# Patient Record
Sex: FEMALE | Race: WHITE | NOT HISPANIC OR LATINO | Employment: OTHER | URBAN - METROPOLITAN AREA
[De-identification: names, ages, dates, MRNs, and addresses within clinical notes are randomized per-mention and may not be internally consistent; named-entity substitution may affect disease eponyms.]

---

## 2017-01-16 RX ORDER — SIMVASTATIN 20 MG
TABLET ORAL
Qty: 30 TABLET | Refills: 2 | Status: SHIPPED | OUTPATIENT
Start: 2017-01-16 | End: 2017-10-09 | Stop reason: SDUPTHER

## 2017-01-16 RX ORDER — PEN NEEDLE, DIABETIC 31 GX5/16"
NEEDLE, DISPOSABLE MISCELLANEOUS
Qty: 100 EACH | Refills: 3 | Status: SHIPPED | OUTPATIENT
Start: 2017-01-16

## 2017-03-14 RX ORDER — PAROXETINE 30 MG/1
TABLET, FILM COATED ORAL
Qty: 60 TABLET | Refills: 2 | Status: SHIPPED | OUTPATIENT
Start: 2017-03-14 | End: 2017-10-09 | Stop reason: SDUPTHER

## 2017-04-19 RX ORDER — LISINOPRIL 10 MG/1
TABLET ORAL
Qty: 30 TABLET | Refills: 0 | OUTPATIENT
Start: 2017-04-19

## 2017-06-13 RX ORDER — LISINOPRIL 10 MG/1
TABLET ORAL
Qty: 30 TABLET | Refills: 0 | OUTPATIENT
Start: 2017-06-13

## 2017-07-03 RX ORDER — LISINOPRIL 10 MG/1
TABLET ORAL
Qty: 30 TABLET | Refills: 0 | OUTPATIENT
Start: 2017-07-03

## 2017-08-04 RX ORDER — SIMVASTATIN 20 MG
TABLET ORAL
Qty: 30 TABLET | Refills: 2 | OUTPATIENT
Start: 2017-08-04

## 2017-10-09 ENCOUNTER — FLU SHOT (OUTPATIENT)
Dept: INTERNAL MEDICINE | Facility: CLINIC | Age: 57
End: 2017-10-09

## 2017-10-09 DIAGNOSIS — I10 ESSENTIAL HYPERTENSION: ICD-10-CM

## 2017-10-09 DIAGNOSIS — IMO0002 UNCONTROLLED TYPE 2 DIABETES MELLITUS WITH COMPLICATION, WITH LONG-TERM CURRENT USE OF INSULIN: ICD-10-CM

## 2017-10-09 PROCEDURE — 90686 IIV4 VACC NO PRSV 0.5 ML IM: CPT | Performed by: INTERNAL MEDICINE

## 2017-10-09 PROCEDURE — 90471 IMMUNIZATION ADMIN: CPT | Performed by: INTERNAL MEDICINE

## 2017-10-09 RX ORDER — LISINOPRIL 20 MG/1
20 TABLET ORAL DAILY
Qty: 30 TABLET | Refills: 0 | Status: SHIPPED | OUTPATIENT
Start: 2017-10-09 | End: 2018-01-11 | Stop reason: SDUPTHER

## 2017-10-09 RX ORDER — BLOOD-GLUCOSE METER
EACH MISCELLANEOUS
Qty: 1 KIT | Refills: 0 | Status: SHIPPED | OUTPATIENT
Start: 2017-10-09

## 2017-10-09 RX ORDER — PAROXETINE 30 MG/1
30 TABLET, FILM COATED ORAL 2 TIMES DAILY
Qty: 60 TABLET | Refills: 0 | Status: SHIPPED | OUTPATIENT
Start: 2017-10-09 | End: 2018-01-11 | Stop reason: SDUPTHER

## 2017-10-09 RX ORDER — SIMVASTATIN 20 MG
20 TABLET ORAL NIGHTLY
Qty: 30 TABLET | Refills: 0 | Status: SHIPPED | OUTPATIENT
Start: 2017-10-09 | End: 2018-01-11 | Stop reason: SDUPTHER

## 2017-10-09 RX ORDER — INSULIN GLARGINE 300 U/ML
25 INJECTION, SOLUTION SUBCUTANEOUS NIGHTLY
Qty: 3 PEN | Refills: 0 | Status: SHIPPED | OUTPATIENT
Start: 2017-10-09 | End: 2018-01-11 | Stop reason: SDUPTHER

## 2017-10-28 DIAGNOSIS — I10 ESSENTIAL HYPERTENSION: ICD-10-CM

## 2017-10-28 RX ORDER — LISINOPRIL 20 MG/1
TABLET ORAL
Qty: 90 TABLET | Refills: 1 | OUTPATIENT
Start: 2017-10-28

## 2017-12-17 DIAGNOSIS — IMO0002 UNCONTROLLED TYPE 2 DIABETES MELLITUS WITH COMPLICATION, WITH LONG-TERM CURRENT USE OF INSULIN: ICD-10-CM

## 2017-12-18 RX ORDER — INSULIN ASPART 100 [IU]/ML
INJECTION, SOLUTION INTRAVENOUS; SUBCUTANEOUS
Refills: 4 | OUTPATIENT
Start: 2017-12-18

## 2018-01-11 ENCOUNTER — OFFICE VISIT (OUTPATIENT)
Dept: INTERNAL MEDICINE | Facility: CLINIC | Age: 58
End: 2018-01-11

## 2018-01-11 VITALS
OXYGEN SATURATION: 99 % | BODY MASS INDEX: 48.03 KG/M2 | WEIGHT: 262.6 LBS | DIASTOLIC BLOOD PRESSURE: 92 MMHG | SYSTOLIC BLOOD PRESSURE: 138 MMHG | HEART RATE: 108 BPM

## 2018-01-11 DIAGNOSIS — E11.00 UNCONTROLLED TYPE 2 DIABETES MELLITUS WITH HYPEROSMOLARITY WITHOUT COMA, WITHOUT LONG-TERM CURRENT USE OF INSULIN (HCC): Primary | ICD-10-CM

## 2018-01-11 DIAGNOSIS — E78.2 MIXED HYPERLIPIDEMIA: ICD-10-CM

## 2018-01-11 DIAGNOSIS — F41.9 ANXIETY: ICD-10-CM

## 2018-01-11 DIAGNOSIS — I10 ESSENTIAL HYPERTENSION: ICD-10-CM

## 2018-01-11 PROCEDURE — 99215 OFFICE O/P EST HI 40 MIN: CPT | Performed by: INTERNAL MEDICINE

## 2018-01-11 PROCEDURE — 83036 HEMOGLOBIN GLYCOSYLATED A1C: CPT | Performed by: INTERNAL MEDICINE

## 2018-01-11 RX ORDER — LISINOPRIL 20 MG/1
20 TABLET ORAL DAILY
Qty: 30 TABLET | Refills: 1 | Status: SHIPPED | OUTPATIENT
Start: 2018-01-11 | End: 2018-03-12 | Stop reason: SDUPTHER

## 2018-01-11 RX ORDER — BLOOD SUGAR DIAGNOSTIC
STRIP MISCELLANEOUS
Refills: 0 | COMMUNITY
Start: 2017-10-23

## 2018-01-11 RX ORDER — PAROXETINE 30 MG/1
30 TABLET, FILM COATED ORAL 2 TIMES DAILY
Qty: 60 TABLET | Refills: 1 | Status: SHIPPED | OUTPATIENT
Start: 2018-01-11 | End: 2018-03-12 | Stop reason: SDUPTHER

## 2018-01-11 RX ORDER — SIMVASTATIN 20 MG
20 TABLET ORAL NIGHTLY
Qty: 30 TABLET | Refills: 1 | Status: SHIPPED | OUTPATIENT
Start: 2018-01-11 | End: 2018-03-12 | Stop reason: SDUPTHER

## 2018-01-11 NOTE — ASSESSMENT & PLAN NOTE
Patient requesting to resume paxil 30mg BID #60, 1RF but emphasized that she needs to seek psychiatric care for her anxiety, mood, and paranoia sxs; contact information given for [facility]

## 2018-01-11 NOTE — PROGRESS NOTES
"Chief Complaint   Patient presents with   • Follow-up     F/U on Dm        History of Present Illness  57 y.o.  woman presents for med refill and DM follow-up.  States reluctance to follow-up due to h/o concerns re: her medical records.  Has not been checking BGs until the last 2 weeks.  Has not been taking her insulin or BP medications.  Has not been taking her paxil but states she \"severely\" needs that medication.  States needing to find another therapist because she just lost her last one.    With her agitation re: the issue with her medical records, repeating the same story at least 5 times, I asked our , Radha Zarate, to participate in the rest of our office visit so patient could be assisted appropriately.    With lack of trust from the patient, patient, my , and I decided that we could no longer proceed with this patient-physician relationship.  Patient admitted to paranoia, indicating previously hospitalization for this.  She stated there are people laughing and mocking her,c calling her names, when she is in public.  Previous co-workers were out to get here.  She admitted that she was not accusing me, her physician, of disclosing her records.     We also advised the patient that there has not been an employee named Annette in our office; this is whom patient states violated her medical privacy and with whom she previously worked.     Review of Systems  ROS (+) for anxiety and paranoia.  Denies visual changes, CP, palpitations, SOB, abd pain, n/v.  Recent BGs in the 230s and higher but states several times she is not sure how to use her glucometer. All other ROS reviewed and negative.    [facility]  The following portions of the patient's history were reviewed and updated as appropriate: allergies, current medications, past family history, past medical history, past social history, past surgical history and problem list.      Current Outpatient Prescriptions:   •  ACCU-CHEK " POLLY PLUS test strip, AD  •  B-D ULTRAFINE III SHORT PEN 31G X 8 MM misc, AD  •  Blood Glucose Monitoring Suppl (ACCU-CHEK POLLY PLUS) AD  NO CURRENT PRESCRIPTION MEDS PER PT      VITALS:  /92  Pulse 108  Wt 119 kg (262 lb 9.6 oz)  SpO2 99%  BMI 48.03 kg/m2    Physical Exam   Constitutional: She appears well-developed and well-nourished.   Obese - baseline   Eyes: Conjunctivae and EOM are normal.   Cardiovascular: Normal rate, regular rhythm and normal heart sounds.    Pulmonary/Chest: Effort normal and breath sounds normal.   Neurological: She is alert.   Psychiatric: Her mood appears anxious. Her affect is labile. She is agitated. Thought content is paranoid and delusional. She expresses inappropriate judgment.   Repetitive in her issues; admits to paranoia; mildly agitated   Nursing note and vitals reviewed.      LABS  Today's A1C 11.14 (was 9.6 in 9/13)    ASSESSMENT/PLAN  Problem List Items Addressed This Visit     Hypertension     BP mildly elevated; will renew lisin 20mg QD #30, 1RF to tide her over until new PCP established but needs lab monitoring         Relevant Medications    lisinopril (PRINIVIL,ZESTRIL) 20 MG tablet    Anxiety     Patient requesting to resume paxil 30mg BID #60, 1RF but emphasized that she needs to seek psychiatric care for her anxiety, mood, and paranoia sxs; contact information given for [facility]         Relevant Medications    PARoxetine (PAXIL) 30 MG tablet    Hyperlipidemia     Will renew simva 20mg QHS #30, 1RF to tide her over until establishing care with new PCP and emphasized needs lab follow-up/evaluation         Relevant Medications    simvastatin (ZOCOR) 20 MG tablet    Uncontrolled type 2 diabetes mellitus - Primary     Emphasized that she needs close follow-up and management of her DM in light of uncontrolled A1C 11.4 and potential systemic complications from uncontrolled DM; patient verbalizes understanding; she already has glucometer and  test strips, and appears to already know how to use it; will resume previous DM med regimen (Toujeo 25 units QHS, Novolog 30u TID w/ meals, both #3 pens, 0RF) but emphasized she needs drug monitoring and medication follow-up; warnings re: hypoglycemia risk - must eat regularly when utilizing insulin         Relevant Medications    insulin aspart (novoLOG FLEXPEN) 100 UNIT/ML solution pen-injector sc pen    Insulin Glargine (TOUJEO SOLOSTAR) 300 UNIT/ML solution pen-injector    Other Relevant Orders    POC Glycosylated Hemoglobin (Hb A1C)      Time Documentation  Counseled patient  Counseling topics: lab results, treatment options, follow up plan and discussion of mental health issues  Total encounter time: counseling time more than 50% of visit: 1 hour    FOLLOW-UP  Discussed at length with patient as well as with , Radha Zarate in presence: no therapeutic trusting relationship between us, therefore patient will establish with new PCP within the next 1-2 months for continued medical care; also advised to seek new psychiatric treatment and counseling team (contact information written down/given to patient for [facility]) by Ms. Zarate; no further RXs from me or [facility] - patient advised and acknowledged plan to estab with new PCP, within 1-2 months; advised patient to request records to transfer when she is ready    Electronically signed by:    Sophia Hayes MD  01/11/2018

## 2018-01-11 NOTE — ASSESSMENT & PLAN NOTE
Will renew simva 20mg QHS #30, 1RF to tide her over until establishing care with new PCP and emphasized needs lab follow-up/evaluation

## 2018-01-11 NOTE — ASSESSMENT & PLAN NOTE
BP mildly elevated; will renew lisin 20mg QD #30, 1RF to tide her over until new PCP established but needs lab monitoring

## 2018-01-14 NOTE — ASSESSMENT & PLAN NOTE
Emphasized that she needs close follow-up and management of her DM in light of uncontrolled A1C 11.4 and potential systemic complications from uncontrolled DM; patient verbalizes understanding; she already has glucometer and test strips, and appears to already know how to use it; will resume previous DM med regimen (Toujeo 25 units QHS, Novolog 30u TID w/ meals, both #3 pens, 0RF) but emphasized she needs drug monitoring and medication follow-up; warnings re: hypoglycemia risk - must eat regularly when utilizing insulin

## 2018-01-15 LAB — HBA1C MFR BLD: 11.4 %

## 2018-03-12 DIAGNOSIS — E78.2 MIXED HYPERLIPIDEMIA: ICD-10-CM

## 2018-03-12 DIAGNOSIS — F41.9 ANXIETY: ICD-10-CM

## 2018-03-12 DIAGNOSIS — I10 ESSENTIAL HYPERTENSION: ICD-10-CM

## 2018-03-12 RX ORDER — LISINOPRIL 20 MG/1
20 TABLET ORAL DAILY
Qty: 30 TABLET | Refills: 1 | Status: SHIPPED | OUTPATIENT
Start: 2018-03-12

## 2018-03-12 RX ORDER — SIMVASTATIN 20 MG
20 TABLET ORAL NIGHTLY
Qty: 30 TABLET | Refills: 1 | Status: SHIPPED | OUTPATIENT
Start: 2018-03-12

## 2018-03-12 RX ORDER — PAROXETINE 30 MG/1
30 TABLET, FILM COATED ORAL 2 TIMES DAILY
Qty: 60 TABLET | Refills: 1 | Status: SHIPPED | OUTPATIENT
Start: 2018-03-12